# Patient Record
Sex: FEMALE | URBAN - METROPOLITAN AREA
[De-identification: names, ages, dates, MRNs, and addresses within clinical notes are randomized per-mention and may not be internally consistent; named-entity substitution may affect disease eponyms.]

---

## 2022-03-29 ENCOUNTER — ATHLETIC TRAINING (OUTPATIENT)
Dept: SPORTS MEDICINE | Facility: OTHER | Age: 22
End: 2022-03-29

## 2022-03-29 DIAGNOSIS — S86.891A MEDIAL TIBIAL STRESS SYNDROME, RIGHT, INITIAL ENCOUNTER: Primary | ICD-10-CM

## 2022-03-29 NOTE — PROGRESS NOTES
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  Pt reported to the [facility] with a c/o right shin pain  Pt stated the pain started on Saturday during the meet  Pt stated they do have pmhx of shin discomfort  Pt stated PQ was a 6/10  Objective:   See treatment log below  Pt was TTP along the medial border of their tibia  Treatment Log: Pt reported strong muscle activation and fatigue during each exercise        Date: 3/29/22       Playing Status: Modified/ As tolerated               Exercise/Treatment        MHP  10min       Tib anterior activation wall sit 2x10       SL woodpecker 2x10       Standing calf stretch 2x20s       Eccentric calf raises (5count down) 3x10

## 2022-03-30 ENCOUNTER — ATHLETIC TRAINING (OUTPATIENT)
Dept: SPORTS MEDICINE | Facility: OTHER | Age: 22
End: 2022-03-30

## 2022-03-30 DIAGNOSIS — S86.891A MEDIAL TIBIAL STRESS SYNDROME, RIGHT, INITIAL ENCOUNTER: Primary | ICD-10-CM

## 2022-03-30 NOTE — PROGRESS NOTES
Athletic Training Progress Note    Name: Pee Arce  Age: 25 y o  Assessment/Plan:   Visit Diagnosis: Medial tibial stress syndrome, right, initial encounter [F51 238S]    Treatment Plan:     []  Follow-up PRN  []  Follow-up prior to next practice/game for re-evaluation  [x]  Daily treatment/rehab  Progress note expected weekly  Subjective: States jogging causes more discomfort compared to sprinting when thinking about practice last night   Was able to    Objective:   No new objective measurements    Treatment Log:     Date: 3/30       Playing Status: Full Go               Exercise/Treatment        Eccentric Calf Raises 3x12       Toe Raises  3x12       IASTM 6 mins                                                                         }

## 2022-03-31 ENCOUNTER — ATHLETIC TRAINING (OUTPATIENT)
Dept: SPORTS MEDICINE | Facility: OTHER | Age: 22
End: 2022-03-31

## 2022-03-31 DIAGNOSIS — S86.891A MEDIAL TIBIAL STRESS SYNDROME, RIGHT, INITIAL ENCOUNTER: Primary | ICD-10-CM

## 2022-03-31 NOTE — PROGRESS NOTES
Athletic Training Progress Note    Name: Iva Ruby  Age: 25 y o  Assessment/Plan:   Visit Diagnosis: Medial tibial stress syndrome, right, initial encounter [K20 462W]    Treatment Plan:     []  Follow-up PRN  []  Follow-up prior to next practice/game for re-evaluation  [x]  Daily treatment/rehab  Progress note expected weekly  Subjective: PQ is a 6/10 when jogging and 4/10 when at rest  She is able to complete practice but the pain emerges after       Objective:   No new objective measurements    Treatment Log:     Date: 3/31 3/30       Playing Status: Full Go  Full Go                Exercise/Treatment         Eccentric Calf Raises 3x12 3x12       Toe Raises  3x12 3x12       4-way ankle 3x12        Calf Stretches:         Gastroc 2x20        Soleus 2x20        Ice Cup 5 min                                              }

## 2022-04-01 ENCOUNTER — ATHLETIC TRAINING (OUTPATIENT)
Dept: SPORTS MEDICINE | Facility: OTHER | Age: 22
End: 2022-04-01

## 2022-04-01 DIAGNOSIS — S86.891A MEDIAL TIBIAL STRESS SYNDROME, RIGHT, INITIAL ENCOUNTER: Primary | ICD-10-CM

## 2022-04-01 NOTE — PROGRESS NOTES
Athletic Training Progress Note     Name: Libertad Aldridge  Age: 25 y o       Assessment/Plan:   Visit Diagnosis: Medial tibial stress syndrome, right, initial encounter [W95 454S]     Treatment Plan:      []? Follow-up PRN  []?  Follow-up prior to next practice/game for re-evaluation  [x]? Daily treatment/rehab   Progress note expected weekly       Subjective: Her pain has decreased to about a 4/10 at the worst, when she is jogging or after she sprints      Objective:   No new objective measurements     Treatment Log:  Date: 3/31 3/30  4/1         Playing Status: Full Go  Full Go  Full Go                         Exercise/Treatment               Eccentric Calf Raises 3x12 3x12  3x12         Toe Raises  3x12 3x12  3x12         4-way ankle 3x12    3x12 blk band         Calf Stretches:               Gastroc 2x20    2x20         Soleus 2x20    2x20         Ice Cup 5 min    5 mins          Massage      x10

## 2022-04-04 ENCOUNTER — ATHLETIC TRAINING (OUTPATIENT)
Dept: SPORTS MEDICINE | Facility: OTHER | Age: 22
End: 2022-04-04

## 2022-04-04 DIAGNOSIS — S86.891A MEDIAL TIBIAL STRESS SYNDROME, RIGHT, INITIAL ENCOUNTER: Primary | ICD-10-CM

## 2022-04-04 NOTE — PROGRESS NOTES
Athletic Training Progress Note    Name: Jaun Yun  Age: 25 y o  Assessment/Plan:     Visit Diagnosis: Medial tibial stress syndrome, right, initial encounter [B15 612T]    Treatment Plan:     []  Follow-up PRN  []  Follow-up prior to next practice/game for re-evaluation  [x]  Daily treatment/rehab  Progress note expected weekly  Subjective: Pt had discomfort after running 400 this weekend      Objective:   DPAS; 8/64  LEFS: 71/80  See treatment log below    Treatment Log:     Date:  4/4/22       Playing Status: As tolerated               Exercise/Treatment        Calf raise added Single leg 3x10       Wall sit toe raise added SL 3x10       Forward lean added step 3x10                                                                         LB ATC

## 2022-04-08 ENCOUNTER — ATHLETIC TRAINING (OUTPATIENT)
Dept: SPORTS MEDICINE | Facility: OTHER | Age: 22
End: 2022-04-08

## 2022-04-08 DIAGNOSIS — S86.891A MEDIAL TIBIAL STRESS SYNDROME, RIGHT, INITIAL ENCOUNTER: Primary | ICD-10-CM

## 2022-04-08 NOTE — PROGRESS NOTES
4/8/22  Completed endurance unloaded running including    Block starts x20  Stool march x150  Lunge finish line x20  Finished with ice cup  LB ATC

## 2022-04-12 ENCOUNTER — ATHLETIC TRAINING (OUTPATIENT)
Dept: SPORTS MEDICINE | Facility: OTHER | Age: 22
End: 2022-04-12

## 2022-04-12 DIAGNOSIS — S86.891A MEDIAL TIBIAL STRESS SYNDROME, RIGHT, INITIAL ENCOUNTER: Primary | ICD-10-CM

## 2022-04-12 NOTE — PROGRESS NOTES
Athletic Training Progress Note    Name: Lady Cobian  Age: 25 y o  Assessment/Plan:     Visit Diagnosis: Medial tibial stress syndrome, right, initial encounter [[id number]]    Treatment Plan: Decrease Pain, Strengthen    []  Follow-up PRN  []  Follow-up prior to next practice/game for re-evaluation  [x]  Daily treatment/rehab  Progress note expected weekly  Subjective: Pt reported for treatment today  Pt stated they competed in the 400 and 4x400 this past weekend  Pt stated they had a PQ of 4/10 after racing on Saturday  Pt reports no discomfort today      Objective:   See treatment log below    Treatment Log:      Date: 4/12/22       Playing Status: As Tolerated               Exercise/Treatment        Unloaded block starts x20       Unloaded seated marches x15       Finish line lunges x20       Tibialis anterior activation with wall squat 2x10       Ice cup massage 10min

## 2022-04-22 ENCOUNTER — ATHLETIC TRAINING (OUTPATIENT)
Dept: SPORTS MEDICINE | Facility: OTHER | Age: 22
End: 2022-04-22

## 2022-04-22 DIAGNOSIS — S86.891A MEDIAL TIBIAL STRESS SYNDROME, RIGHT, INITIAL ENCOUNTER: Primary | ICD-10-CM

## 2022-04-22 NOTE — PROGRESS NOTES
Athletic Training Progress Note    Name: Messi Ng  Age: 25 y o  Assessment/Plan:     Visit Diagnosis: Medial tibial stress syndrome, right, initial encounter [S6 179M]    Treatment Plan: Increase quick, shorter sprint engagements    []  Follow-up PRN  []  Follow-up prior to next practice/game for re-evaluation  [x]  Daily treatment/rehab  Progress note expected weekly  Subjective: Pt states that her lower legs are feeling better during 400  800 meter runs but not a good during 200 meter runs      Objective:   See treatment log below    Treatment Log:     Date:  4/4/22 4/22/22      Playing Status: As tolerated Full Go              Exercise/Treatment        Calf raise added Single leg 3x10       Wall sit toe raise added SL 3x10       Forward lean added step 3x10 3x10      Running on table  3x10      Stool running  3x10                                                        LB ATC

## 2022-05-09 ENCOUNTER — ATHLETIC TRAINING (OUTPATIENT)
Dept: SPORTS MEDICINE | Facility: OTHER | Age: 22
End: 2022-05-09

## 2022-05-09 DIAGNOSIS — S86.891A MEDIAL TIBIAL STRESS SYNDROME, RIGHT, INITIAL ENCOUNTER: Primary | ICD-10-CM

## 2022-05-09 NOTE — PROGRESS NOTES
Athletic Training Progress Note    Name: Óscar Campos  Age: 25 y o  Assessment/Plan:     Visit Diagnosis: Medial tibial stress syndrome, right, initial encounter [U30 158Y]    Treatment Plan: Increase quick, shorter sprint engagements    []  Follow-up PRN  []  Follow-up prior to next practice/game for re-evaluation  [x]  Daily treatment/rehab  Progress note expected weekly  Subjective: Pt states that her lower legs are feeling better that Saturday after her race  Pt states feeling a PQ of 3/10 when active  Pt describes discomfort as dull and achey    Objective:   See treatment log below    Treatment Log:     Date:  4/4/22 4/22/22 5/9/22     Playing Status: As tolerated Full Go Full Go             Exercise/Treatment        Calf raise added Single leg 3x10  3x10     Wall sit toe raise added SL 3x10  3x1min     Forward lean added step 3x10 3x10      Running on table  3x10      Stool running  3x10 200x B/L     Primal reflex for shin   3min     Ice cup massage   10min

## 2023-03-29 ENCOUNTER — ATHLETIC TRAINING (OUTPATIENT)
Dept: SPORTS MEDICINE | Facility: OTHER | Age: 23
End: 2023-03-29

## 2023-03-29 DIAGNOSIS — S86.891A RIGHT MEDIAL TIBIAL STRESS SYNDROME, INITIAL ENCOUNTER: Primary | ICD-10-CM

## 2023-03-29 NOTE — PROGRESS NOTES
3/29/23  A: Right Medial Tibial Stress Syndrome  S: Pt states that her shins normally have bothered her during seasons  This year it has been worse during the transition from indoor to outdoor  Pain: 2 at rest and Right 8 and Left 6 after speed workouts  O: TTP medial shin Limited Left hip flexor motion and Right hip rotation  P: Table Hip abduction Left 5 Right 10, heel to hip Right 10 Left 5, stool lunge Left 10, Slide lunge right lift 10, slideboard 1', and warrior one 5    LB ATC

## 2023-03-31 ENCOUNTER — ATHLETIC TRAINING (OUTPATIENT)
Dept: SPORTS MEDICINE | Facility: OTHER | Age: 23
End: 2023-03-31

## 2023-03-31 DIAGNOSIS — S86.891A RIGHT MEDIAL TIBIAL STRESS SYNDROME, INITIAL ENCOUNTER: Primary | ICD-10-CM

## 2023-03-31 NOTE — PROGRESS NOTES
3/29/23  A: Right Medial Tibial Stress Syndrome  S: Pt states that her shins normally have bothered her during seasons  This year it has been worse during the transition from indoor to outdoor  Pain: 2 at rest and Right 8 and Left 6 after speed workouts  O: TTP medial shin Limited Left hip flexor motion and Right hip rotation  P: Table Hip abduction Left 5 Right 10, heel to hip Right 10 Left 5, stool lunge Left 10, Slide lunge right lift 10, slideboard 1', and warrior one 5    LB ATC    Exercises: 3/31      Table Hip Abduction L 5 R 10      Heel To Hip L 5 R 10      Stool Lunge L 10      Slide lunge R lift 10      Slideboard 1'      Warrior one 5